# Patient Record
Sex: FEMALE | Race: WHITE | NOT HISPANIC OR LATINO | Employment: UNEMPLOYED | URBAN - METROPOLITAN AREA
[De-identification: names, ages, dates, MRNs, and addresses within clinical notes are randomized per-mention and may not be internally consistent; named-entity substitution may affect disease eponyms.]

---
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
## 2025-01-12 ENCOUNTER — HOSPITAL ENCOUNTER (EMERGENCY)
Facility: CLINIC | Age: 20
Discharge: HOME | End: 2025-01-12
Attending: PHYSICIAN ASSISTANT | Admitting: PHYSICIAN ASSISTANT
Payer: COMMERCIAL

## 2025-01-12 ENCOUNTER — APPOINTMENT (OUTPATIENT)
Dept: GENERAL RADIOLOGY | Facility: CLINIC | Age: 20
End: 2025-01-12
Attending: EMERGENCY MEDICINE
Payer: COMMERCIAL

## 2025-01-12 VITALS
BODY MASS INDEX: 17.93 KG/M2 | HEIGHT: 64 IN | TEMPERATURE: 97.7 F | SYSTOLIC BLOOD PRESSURE: 128 MMHG | OXYGEN SATURATION: 99 % | HEART RATE: 89 BPM | RESPIRATION RATE: 15 BRPM | DIASTOLIC BLOOD PRESSURE: 71 MMHG | WEIGHT: 105 LBS

## 2025-01-12 DIAGNOSIS — S81.811A LACERATION OF RIGHT LOWER EXTREMITY, INITIAL ENCOUNTER: ICD-10-CM

## 2025-01-12 DIAGNOSIS — Z76.0 ENCOUNTER FOR MEDICATION REFILL: ICD-10-CM

## 2025-01-12 PROCEDURE — 250N000009 HC RX 250: Performed by: PHYSICIAN ASSISTANT

## 2025-01-12 PROCEDURE — 99283 EMERGENCY DEPT VISIT LOW MDM: CPT | Mod: 25

## 2025-01-12 PROCEDURE — 90471 IMMUNIZATION ADMIN: CPT | Performed by: PHYSICIAN ASSISTANT

## 2025-01-12 PROCEDURE — 250N000011 HC RX IP 250 OP 636: Performed by: PHYSICIAN ASSISTANT

## 2025-01-12 PROCEDURE — 73590 X-RAY EXAM OF LOWER LEG: CPT | Mod: RT

## 2025-01-12 PROCEDURE — 90715 TDAP VACCINE 7 YRS/> IM: CPT | Performed by: PHYSICIAN ASSISTANT

## 2025-01-12 PROCEDURE — 12001 RPR S/N/AX/GEN/TRNK 2.5CM/<: CPT

## 2025-01-12 RX ORDER — OLANZAPINE 5 MG/1
2.5-5 TABLET ORAL 2 TIMES DAILY PRN
Qty: 10 TABLET | Refills: 0 | Status: SHIPPED | OUTPATIENT
Start: 2025-01-12

## 2025-01-12 RX ORDER — LIDOCAINE 40 MG/G
CREAM TOPICAL
Status: COMPLETED | OUTPATIENT
Start: 2025-01-12 | End: 2025-01-12

## 2025-01-12 RX ADMIN — LIDOCAINE: 40 CREAM TOPICAL at 22:20

## 2025-01-12 RX ADMIN — CLOSTRIDIUM TETANI TOXOID ANTIGEN (FORMALDEHYDE INACTIVATED), CORYNEBACTERIUM DIPHTHERIAE TOXOID ANTIGEN (FORMALDEHYDE INACTIVATED), BORDETELLA PERTUSSIS TOXOID ANTIGEN (GLUTARALDEHYDE INACTIVATED), BORDETELLA PERTUSSIS FILAMENTOUS HEMAGGLUTININ ANTIGEN (FORMALDEHYDE INACTIVATED), BORDETELLA PERTUSSIS PERTACTIN ANTIGEN, AND BORDETELLA PERTUSSIS FIMBRIAE 2/3 ANTIGEN 0.5 ML: 5; 2; 2.5; 5; 3; 5 INJECTION, SUSPENSION INTRAMUSCULAR at 22:51

## 2025-01-12 ASSESSMENT — COLUMBIA-SUICIDE SEVERITY RATING SCALE - C-SSRS
2. HAVE YOU ACTUALLY HAD ANY THOUGHTS OF KILLING YOURSELF IN THE PAST MONTH?: NO
6. HAVE YOU EVER DONE ANYTHING, STARTED TO DO ANYTHING, OR PREPARED TO DO ANYTHING TO END YOUR LIFE?: YES
1. IN THE PAST MONTH, HAVE YOU WISHED YOU WERE DEAD OR WISHED YOU COULD GO TO SLEEP AND NOT WAKE UP?: NO

## 2025-01-12 ASSESSMENT — ACTIVITIES OF DAILY LIVING (ADL): ADLS_ACUITY_SCORE: 41

## 2025-01-13 NOTE — ED TRIAGE NOTES
2 cm laceration to right shin with a piece of glass of a picture frame that broke about 45 mins ago.     Triage Assessment (Adult)       Row Name 01/12/25 1654          Triage Assessment    Airway WDL WDL        Respiratory WDL    Respiratory WDL WDL        Skin Circulation/Temperature WDL    Skin Circulation/Temperature WDL X  2 cm lac to right shin        Cardiac WDL    Cardiac WDL WDL        Peripheral/Neurovascular WDL    Peripheral Neurovascular WDL WDL        Cognitive/Neuro/Behavioral WDL    Cognitive/Neuro/Behavioral WDL WDL

## 2025-01-13 NOTE — DISCHARGE INSTRUCTIONS
Clean wound daily with water and soap.  Evaluate wound daily for pus drainage, spreading redness, streaking redness up the leg. Be reevaluated should these occur.  If wound is well appearing, keep covered with topical antibiotic and bandage.   Sutures need to be removed in 10-14 days at primary doctor.

## 2025-01-13 NOTE — ED PROVIDER NOTES
"  Emergency Department Note      History of Present Illness     Chief Complaint   Leg Injury      HPI   Jessenia Pedroza is a 19 year old female who presents to the ED for evaluation of a laceration. Patient reports that she walked by a box filled with glass about 45 minutes ago when a piece of glass cut her right shin. No numbness or tingling. Tetanus 2015    Independent Historian   None    Review of External Notes   None    Past Medical History     Medical History and Problem List   No past medical history on file.    Medications   OLANZapine (ZYPREXA) 5 MG tablet  escitalopram (LEXAPRO) 10 MG tablet  OLANZapine (ZYPREXA) 5 MG tablet  ondansetron (ZOFRAN ODT) 4 MG ODT tab  polyethylene glycol (MIRALAX) 17 GM/Dose powder  spironolactone (ALDACTONE) 50 MG tablet  traZODone (DESYREL) 50 MG tablet        Surgical History   No past surgical history on file.    Physical Exam     Patient Vitals for the past 24 hrs:   BP Temp Temp src Pulse Resp SpO2 Height Weight   01/12/25 2106 128/71 97.7  F (36.5  C) Temporal 89 15 99 % 1.626 m (5' 4\") 47.6 kg (105 lb)     Physical Exam  HENT:  mmm  Eyes: PERRL B/L  Neck: Supple  CV: Peripheral pulses in tact and regular  Resp: Speaking in full sentences without any respiratory distress  Ext:  Right lower extremity  1.75cm laceration noted to mid shin.  No bony TTP.  Full ROM of ankle.  Sensation intact distally.  2+ PT pulse.  Remainder of the skeletal survey is unremarkable  Skin: warm dry well perfused. See above.  Neuro: Alert, no gross motor or sensory deficits, gait stable  Psych: Calm  Nursing notes and vital signs reviewed.      Diagnostics     Lab Results   Labs Ordered and Resulted from Time of ED Arrival to Time of ED Departure - No data to display    Imaging   XR Tibia and Fibula Right 2 Views   Final Result   IMPRESSION: No acute displaced fracture or subluxation. No radiopaque foreign body.        Independent Interpretation   I personally evaluated the XR, no " obvious FB noted.    ED Course      Medications Administered   Medications   Tdap (tetanus-diphtheria-acell pertussis) (ADACEL) injection 0.5 mL (has no administration in time range)   lidocaine (LMX4) cream ( Topical $Given 1/12/25 2220)       Procedures   [facility]    -Laceration Repair    Date/Time: 1/12/2025 10:41 PM    Performed by: Lloyd Rebolledo PA-C  Authorized by: Lloyd Rebolledo PA-C    Risks, benefits and alternatives discussed.      ANESTHESIA (see MAR for exact dosages):     Anesthesia method:  Local infiltration    Local anesthetic:  Lidocaine 1% w/o epi  LACERATION DETAILS     Location:  Leg    Leg location:  R lower leg    Length (cm):  1.8    REPAIR TYPE:     Repair type:  Simple      TREATMENT:     Area cleansed with:  Saline and Shur-Clens    Amount of cleaning:  Standard    SKIN REPAIR     Repair method:  Sutures    Suture size:  4-0    Suture material:  Nylon    Suture technique:  Simple interrupted    Number of sutures:  5    APPROXIMATION     Approximation:  Close    POST-PROCEDURE DETAILS     Dressing:  Antibiotic ointment and adhesive bandage      PROCEDURE    Patient Tolerance:  Patient tolerated the procedure well with no immediate complications       Discussion of Management   None    ED Course        Additional Documentation  None    Medical Decision Making / Diagnosis     CMS Diagnoses: None    MIPS       None    MDM   Jessenia Pedroza is a 19 year old female who presents for evaluation of a laceration to the leg. See HPI as above for additional details. Vitals and physical exam as above.  The wound was carefully evaluated and explored.  The laceration was closed as noted above.  There is no evidence of muscular, tendon, or bony damage with this laceration.  XR without suggestion for radiopaque FB. No signs of foreign body on exam.  Possible complications (infection, scarring) were reviewed with the patient.  Follow up with primary care as noted in the  discharge section. Patient did request refill of Zyprexa that she had received from [facility] recently. She is working on scheduling appointment with psychiatry. Will provide short term refill. Discussed reasons to return. All questions answered. Patient discharged to home in stable condition.    Disposition   The patient was discharged.     Diagnosis     ICD-10-CM    1. Laceration of right lower extremity, initial encounter  S81.811A       2. Encounter for medication refill  Z76.0            Discharge Medications   New Prescriptions    OLANZAPINE (ZYPREXA) 5 MG TABLET    Take 0.5-1 tablets (2.5-5 mg) by mouth 2 times daily as needed (for severe anxiety or sleep).       This record was created at least in part using electronic voice recognition software, so please excuse any typographical errors.         Lloyd Rebolledo PA-C  01/12/25 0635